# Patient Record
Sex: MALE | Race: WHITE | URBAN - METROPOLITAN AREA
[De-identification: names, ages, dates, MRNs, and addresses within clinical notes are randomized per-mention and may not be internally consistent; named-entity substitution may affect disease eponyms.]

---

## 2017-11-01 ENCOUNTER — OFFICE VISIT (OUTPATIENT)
Dept: FAMILY MEDICINE | Facility: CLINIC | Age: 22
End: 2017-11-01
Payer: COMMERCIAL

## 2017-11-01 VITALS
WEIGHT: 146 LBS | OXYGEN SATURATION: 97 % | DIASTOLIC BLOOD PRESSURE: 82 MMHG | TEMPERATURE: 98 F | BODY MASS INDEX: 20.9 KG/M2 | RESPIRATION RATE: 20 BRPM | HEART RATE: 92 BPM | SYSTOLIC BLOOD PRESSURE: 122 MMHG | HEIGHT: 70 IN

## 2017-11-01 DIAGNOSIS — B36.0 TINEA VERSICOLOR: Primary | ICD-10-CM

## 2017-11-01 DIAGNOSIS — Z83.438 FAMILY HISTORY OF MIXED HYPERLIPIDEMIA: ICD-10-CM

## 2017-11-01 PROCEDURE — 99202 OFFICE O/P NEW SF 15 MIN: CPT | Performed by: FAMILY MEDICINE

## 2017-11-01 RX ORDER — KETOCONAZOLE 200 MG/1
200 TABLET ORAL DAILY
Qty: 21 TABLET | Refills: 1 | Status: SHIPPED | OUTPATIENT
Start: 2017-11-01

## 2017-11-01 NOTE — MR AVS SNAPSHOT
After Visit Summary   11/1/2017    Cedrick River    MRN: [id number]           Patient Information     Date Of Birth          1995        Visit Information        Provider Department      11/1/2017 2:45 PM Moo Ledesma MD [facility]        Today's Diagnoses     Tinea versicolor    -  1    Family history of mixed hyperlipidemia          Care Instructions    Take 2 tabs first dose, work up sweat but then air dry (do not towel off)   Then 1 daily           Follow-ups after your visit        Future tests that were ordered for you today     Open Future Orders        Priority Expected Expires Ordered    Lipid panel reflex to direct LDL Fasting Routine 11/2/2017 11/17/2017 11/1/2017            Who to contact     If you have questions or need follow up information about today's clinic visit or your schedule please contact [facility] directly at [phone].  Normal or non-critical lab and imaging results will be communicated to you by MyChart, letter or phone within 4 business days after the clinic has received the results. If you do not hear from us within 7 days, please contact the clinic through [employer] or phone. If you have a critical or abnormal lab result, we will notify you by phone as soon as possible.  Submit refill requests through [employer] or call your pharmacy and they will forward the refill request to us. Please allow 3 business days for your refill to be completed.          Additional Information About Your Visit        MyChart Information     [employer] gives you secure access to your electronic health record. If you see a primary care provider, you can also send messages to your care team and make appointments. If you have questions, please call your primary care clinic.  If you do not have a primary care provider, please call [phone] and they will assist you.        Care EveryWhere ID     This is your Care  "EveryWhere ID. This could be used by other organizations to access your [facility] medical records  [id number]        Your Vitals Were     Pulse Temperature Respirations Height Pulse Oximetry BMI (Body Mass Index)    92 98  F (36.7  C) (Tympanic) 20 5' 9.96\" (1.777 m) 97% 20.97 kg/m2       Blood Pressure from Last 3 Encounters:   11/01/17 122/82   06/19/15 130/81   08/21/12 122/78    Weight from Last 3 Encounters:   11/01/17 146 lb (66.2 kg)   06/18/15 140 lb (63.5 kg)   08/21/12 145 lb 6.4 oz (66 kg) (36 %)*     * Growth percentiles are based on [facility] 2-20 Years data.                 Today's Medication Changes          These changes are accurate as of: 11/1/17  3:16 PM.  If you have any questions, ask your nurse or doctor.               Start taking these medicines.        Dose/Directions    ketoconazole 200 MG tablet   Commonly known as:  NIZORAL   Used for:  Tinea versicolor   Started by:  Moo Ledesma MD        Dose:  200 mg   Take 1 tablet (200 mg) by mouth daily   Quantity:  21 tablet   Refills:  1            Where to get your medicines      These medications were sent to [facility]/pharmacy #[id number] - [address] [address]     Phone:  [phone]     ketoconazole 200 MG tablet                Primary Care Provider Office Phone # Fax #    [facility] [phone] [address] [facility] [address]        Equal Access to Services     MEDHAT HERNANDES AH: Hadii brando guajardo hadasho Soomaali, waaxda luqadaha, qaybta kaalmada adebryant, stefan conteh. So [facility] [phone].    ATENCIÓN: Si habla español, tiene a blanton disposición servicios gratuitos de asistencia lingüística. Llame al [phone].    We comply with applicable federal civil rights laws and [place] laws. We do not discriminate on the basis of race, color, national origin, age, disability, sex, sexual orientation, or gender " identity.            Thank you!     Thank you for choosing [facility]  for your care. Our goal is always to provide you with excellent care. Hearing back from our patients is one way we can continue to improve our services. Please take a few minutes to complete the written survey that you may receive in the mail after your visit with us. Thank you!             Your Updated Medication List - Protect others around you: Learn how to safely use, store and throw away your medicines at www.disposemymeds.org.          This list is accurate as of: 11/1/17  3:16 PM.  Always use your most recent med list.                   Brand Name Dispense Instructions for use Diagnosis    HYDROcodone-acetaminophen 5-325 MG per tablet    NORCO    20 tablet    Take 1-2 tablets by mouth every 4 hours as needed for pain        ketoconazole 200 MG tablet    NIZORAL    21 tablet    Take 1 tablet (200 mg) by mouth daily    Tinea versicolor

## 2017-11-01 NOTE — NURSING NOTE
"Chief Complaint   Patient presents with     Blood Draw     blood type and cholesterol check     Derm Problem       Initial /82 (BP Location: Right arm, Patient Position: Sitting, Cuff Size: Adult Regular)  Pulse 92  Temp 98  F (36.7  C) (Tympanic)  Resp 20  Ht 5' 9.96\" (1.777 m)  Wt 146 lb (66.2 kg)  SpO2 97%  BMI 20.97 kg/m2 Estimated body mass index is 20.97 kg/(m^2) as calculated from the following:    Height as of this encounter: 5' 9.96\" (1.777 m).    Weight as of this encounter: 146 lb (66.2 kg).  Medication Reconciliation: complete     Princess BOBBY Nieto CMA      "

## 2017-11-01 NOTE — PROGRESS NOTES
"  SUBJECTIVE:   Cedrick River is a 22 year old male who presents to clinic today for the following health issues:      Rash      Duration: unknown but states it's been a long time    Description  Location: Upper chest and abdomen  Itching: no    Intensity:  mild    Accompanying signs and symptoms: Splotchy    History (similar episodes/previous evaluation): None    Precipitating or alleviating factors:  New exposures:  None  Recent travel: no      Therapies tried and outcome: none    Rash varies, seems to be more evident by end of summer to fall     Lab work      Description (location/character/radiation): Requesting lab work for blood type and cholesterol.    Intensity:  NA    Accompanying signs and symptoms:     History (similar episodes/previous evaluation): Family Hx high triglycerides     Precipitating or alleviating factors: None    Therapies tried and outcome: None   Pt is curious about his blood type    Father has Hx of high triglycerides.  Now on medication  No family Hx of early heart disease that he is aware of            Problem list and histories reviewed & adjusted, as indicated.  Additional history: as documented    Labs reviewed in EPIC    Reviewed and updated as needed this visit by clinical staff     Reviewed and updated as needed this visit by Provider         ROS:  CONSTITUTIONAL:NEGATIVE for fever, chills, change in weight  INTEGUMENTARY/SKIN: POSITIVE for rash abdomen and torso  RESP:NEGATIVE for significant cough or SOB  CV: NEGATIVE for chest pain, palpitations or peripheral edema    OBJECTIVE:                                                    /82 (BP Location: Right arm, Patient Position: Sitting, Cuff Size: Adult Regular)  Pulse 92  Temp 98  F (36.7  C) (Tympanic)  Resp 20  Ht 5' 9.96\" (1.777 m)  Wt 146 lb (66.2 kg)  SpO2 97%  BMI 20.97 kg/m2  Body mass index is 20.97 kg/(m^2).  GENERAL APPEARANCE: healthy, alert and no distress  RESP: lungs clear to auscultation - no rales, " rhonchi or wheezes  CV: regular rates and rhythm, normal S1 S2, no S3 or S4 and no murmur, click or rub  SKIN: patchy circular rash on abdomen to upper chest, none on back     Diagnostic test results:  Lab: see below, future lab orders pended       ASSESSMENT/PLAN:                                                        ICD-10-CM    1. Tinea versicolor B36.0 ketoconazole (NIZORAL) 200 MG tablet   2. Family history of mixed hyperlipidemia Z83.2 Lipid panel reflex to direct LDL Fasting       Follow up with Provider - as needed  Discussed with Pt.  Little value in doing blood typing.  Insurance usually will not pay for testing. He is encouraged to donate blood, they will test at that time  He will return fasting to get lipid panel done    Patient Instructions   Take 2 tabs first dose, work up sweat but then air dry (do not towel off)   Then 1 daily       Moo Ledesma MD  [facility]

## 2020-02-23 ENCOUNTER — HEALTH MAINTENANCE LETTER (OUTPATIENT)
Age: 25
End: 2020-02-23